# Patient Record
Sex: MALE | Race: WHITE | Employment: FULL TIME | URBAN - METROPOLITAN AREA
[De-identification: names, ages, dates, MRNs, and addresses within clinical notes are randomized per-mention and may not be internally consistent; named-entity substitution may affect disease eponyms.]

---

## 2022-08-04 ENCOUNTER — HOSPITAL ENCOUNTER (EMERGENCY)
Age: 37
Discharge: HOME | End: 2022-08-04
Attending: STUDENT IN AN ORGANIZED HEALTH CARE EDUCATION/TRAINING PROGRAM

## 2022-08-04 VITALS
DIASTOLIC BLOOD PRESSURE: 90 MMHG | WEIGHT: 203 LBS | TEMPERATURE: 98.9 F | SYSTOLIC BLOOD PRESSURE: 146 MMHG | OXYGEN SATURATION: 100 % | HEART RATE: 87 BPM | BODY MASS INDEX: 25.24 KG/M2 | RESPIRATION RATE: 19 BRPM | HEIGHT: 75 IN

## 2022-08-04 DIAGNOSIS — R42 LIGHTHEADEDNESS: Primary | ICD-10-CM

## 2022-08-04 LAB
ANION GAP SERPL CALC-SCNC: 5 MMOL/L (ref 5–15)
BASOPHILS # BLD: 0 K/UL (ref 0–0.1)
BASOPHILS NFR BLD: 1 % (ref 0–1)
BUN SERPL-MCNC: 7 MG/DL (ref 6–20)
BUN/CREAT SERPL: 8 (ref 12–20)
CALCIUM SERPL-MCNC: 9.2 MG/DL (ref 8.5–10.1)
CHLORIDE SERPL-SCNC: 106 MMOL/L (ref 97–108)
CO2 SERPL-SCNC: 29 MMOL/L (ref 21–32)
CREAT SERPL-MCNC: 0.91 MG/DL (ref 0.7–1.3)
DIFFERENTIAL METHOD BLD: ABNORMAL
EOSINOPHIL # BLD: 0.1 K/UL (ref 0–0.4)
EOSINOPHIL NFR BLD: 1 % (ref 0–7)
ERYTHROCYTE [DISTWIDTH] IN BLOOD BY AUTOMATED COUNT: 13.9 % (ref 11.5–14.5)
GLUCOSE BLD STRIP.AUTO-MCNC: 148 MG/DL (ref 65–117)
GLUCOSE SERPL-MCNC: 168 MG/DL (ref 65–100)
HCT VFR BLD AUTO: 48.5 % (ref 36.6–50.3)
HGB BLD-MCNC: 17.4 G/DL (ref 12.1–17)
IMM GRANULOCYTES # BLD AUTO: 0 K/UL (ref 0–0.04)
IMM GRANULOCYTES NFR BLD AUTO: 0 % (ref 0–0.5)
LYMPHOCYTES # BLD: 1.6 K/UL (ref 0.8–3.5)
LYMPHOCYTES NFR BLD: 24 % (ref 12–49)
MCH RBC QN AUTO: 29.4 PG (ref 26–34)
MCHC RBC AUTO-ENTMCNC: 35.9 G/DL (ref 30–36.5)
MCV RBC AUTO: 82.1 FL (ref 80–99)
MONOCYTES # BLD: 0.5 K/UL (ref 0–1)
MONOCYTES NFR BLD: 7 % (ref 5–13)
NEUTS SEG # BLD: 4.5 K/UL (ref 1.8–8)
NEUTS SEG NFR BLD: 67 % (ref 32–75)
NRBC # BLD: 0 K/UL (ref 0–0.01)
NRBC BLD-RTO: 0 PER 100 WBC
PLATELET # BLD AUTO: 154 K/UL (ref 150–400)
PMV BLD AUTO: 10.6 FL (ref 8.9–12.9)
POTASSIUM SERPL-SCNC: 3.9 MMOL/L (ref 3.5–5.1)
RBC # BLD AUTO: 5.91 M/UL (ref 4.1–5.7)
SERVICE CMNT-IMP: ABNORMAL
SODIUM SERPL-SCNC: 140 MMOL/L (ref 136–145)
WBC # BLD AUTO: 6.6 K/UL (ref 4.1–11.1)

## 2022-08-04 PROCEDURE — 96360 HYDRATION IV INFUSION INIT: CPT

## 2022-08-04 PROCEDURE — 85025 COMPLETE CBC W/AUTO DIFF WBC: CPT

## 2022-08-04 PROCEDURE — 74011250637 HC RX REV CODE- 250/637: Performed by: STUDENT IN AN ORGANIZED HEALTH CARE EDUCATION/TRAINING PROGRAM

## 2022-08-04 PROCEDURE — 80048 BASIC METABOLIC PNL TOTAL CA: CPT

## 2022-08-04 PROCEDURE — 74011250636 HC RX REV CODE- 250/636: Performed by: STUDENT IN AN ORGANIZED HEALTH CARE EDUCATION/TRAINING PROGRAM

## 2022-08-04 PROCEDURE — 36415 COLL VENOUS BLD VENIPUNCTURE: CPT

## 2022-08-04 PROCEDURE — 82962 GLUCOSE BLOOD TEST: CPT

## 2022-08-04 PROCEDURE — 99284 EMERGENCY DEPT VISIT MOD MDM: CPT

## 2022-08-04 RX ORDER — ONDANSETRON 4 MG/1
4 TABLET, ORALLY DISINTEGRATING ORAL
Status: COMPLETED | OUTPATIENT
Start: 2022-08-04 | End: 2022-08-04

## 2022-08-04 RX ADMIN — SODIUM CHLORIDE 1000 ML: 9 INJECTION, SOLUTION INTRAVENOUS at 11:52

## 2022-08-04 RX ADMIN — ONDANSETRON 4 MG: 4 TABLET, ORALLY DISINTEGRATING ORAL at 11:52

## 2022-08-04 NOTE — Clinical Note
[address]  [facility] EMERGENCY DEPT  [phone] [id number]  [phone]    Work/School Note    Date: 8/4/2022    To Whom It May concern:    Sammi Holt was seen and treated today in the emergency room by the following provider(s):  Attending Provider: Elham Powers MD.      Sammi Holt is excused from work/school on 08/04/22 and 08/05/22. He is medically clear to return to work/school on 8/6/2022.        Sincerely,          Elías Coffey MD

## 2022-08-04 NOTE — ED PROVIDER NOTES
59-year-old male with history of DM presents to the ED with chief complaint of lightheadedness starting this morning. 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 Patient reports associated difficulty focusing his vision and nausea. Patient says he is out of his metformin, does not check his blood sugar at home. Denies any chest pain, difficulty breathing, vomiting, fevers, chills, cough, body aches, urinary symptoms, bowel symptoms. The history is provided by the patient. Dizziness  Associated symptoms include nausea. Pertinent negatives include no shortness of breath, no chest pain, no vomiting, no confusion and no headaches. Chills   Pertinent negatives include no chest pain, no diarrhea, no vomiting, no congestion, no headaches, no cough, no shortness of breath, no neck pain and no rash. Past Medical History:   Diagnosis Date    Diabetes ([facility] [address]        No past surgical history on file. No family history on file. Social History     Socioeconomic History    Marital status: Not on file     Spouse name: Not on file    Number of children: Not on file    Years of education: Not on file    Highest education level: Not on file   Occupational History    Not on file   Tobacco Use    Smoking status: Not on file    Smokeless tobacco: Not on file   Substance and Sexual Activity    Alcohol use: Not on file    Drug use: Not on file    Sexual activity: Not on file   Other Topics Concern    Not on file   Social History Narrative    Not on file     Social Determinants of Health     Financial Resource Strain: Not on file   Food Insecurity: Not on file   Transportation Needs: Not on file   Physical Activity: Not on file   Stress: Not on file   Social Connections: Not on file   Intimate Partner Violence: Not on file   Housing Stability: Not on file         ALLERGIES: Patient has no known allergies. Review of Systems   Constitutional:  Negative for chills and fever.    HENT: Negative for congestion and rhinorrhea. Eyes:  Positive for visual disturbance. Respiratory:  Negative for cough, shortness of breath and wheezing. Cardiovascular:  Negative for chest pain and leg swelling. Gastrointestinal:  Positive for nausea. Negative for abdominal pain, constipation, diarrhea and vomiting. Genitourinary:  Negative for difficulty urinating, dysuria and hematuria. Musculoskeletal:  Negative for back pain and neck pain. Skin:  Negative for color change and rash. Neurological:  Positive for dizziness. Negative for weakness, numbness and headaches. Psychiatric/Behavioral:  Negative for behavioral problems and confusion. Vitals:    08/04/22 1117   BP: (!) 160/99   Pulse: (!) 105   Resp: 20   Temp: 99.8 °F (37.7 °C)   SpO2: 99%   Weight: 92.1 kg (203 lb)   Height: 6' 3\" (1.905 m)            Physical Exam  Constitutional:       General: He is not in acute distress. Appearance: He is well-developed. HENT:      Head: Normocephalic and atraumatic. Eyes:      Conjunctiva/sclera: Conjunctivae normal.      Pupils: Pupils are equal, round, and reactive to light. Neck:      Trachea: No tracheal deviation. Cardiovascular:      Rate and Rhythm: Regular rhythm. Tachycardia present. Heart sounds: No murmur heard. No friction rub. No gallop. Pulmonary:      Effort: No respiratory distress. Breath sounds: Normal breath sounds. Abdominal:      General: Bowel sounds are normal. There is no distension. Palpations: Abdomen is soft. Tenderness: There is no abdominal tenderness. Musculoskeletal:         General: No deformity. Cervical back: Neck supple. Skin:     General: Skin is warm and dry. Neurological:      General: No focal deficit present. Mental Status: He is alert and oriented to person, place, and time. Cranial Nerves: No cranial nerve deficit. Sensory: No sensory deficit. Motor: No weakness.         MDM  Number of Diagnoses or Management Options  Lightheadedness  Diagnosis management comments: 21-year-old male presenting with lightheadedness. Differential includes viral illness, electrolyte abnormality, dehydration, VICKY. No concern for neurologic cause based on history or exam.  Will check basic labs, give fluids and Zofran, and reevaluate. Likely discharge. Amount and/or Complexity of Data Reviewed  Clinical lab tests: ordered and reviewed           Procedures    DISCHARGE NOTE:  12:50 PM  The patient has been re-evaluated and feeling much better and are stable for discharge. All available radiology and laboratory results have been reviewed with patient and/or available family. Patient and/or family verbally conveyed their understanding and agreement of the patient's signs, symptoms, diagnosis, treatment and prognosis and additionally agree to follow-up as recommended in the discharge instructions or to return to the Emergency Department should their condition change or worsen prior to their follow-up appointment. All questions have been answered and patient and/or available family express understanding.       LABORATORY RESULTS:  Recent Results (from the past 24 hour(s))   GLUCOSE, POC    Collection Time: 08/04/22 11:16 AM   Result Value Ref Range    Glucose (POC) 148 (H) 65 - 117 mg/dL    Performed by Alana Hinton    CBC WITH AUTOMATED DIFF    Collection Time: 08/04/22 11:42 AM   Result Value Ref Range    WBC 6.6 4.1 - 11.1 K/uL    RBC 5.91 (H) 4.10 - 5.70 M/uL    HGB 17.4 (H) 12.1 - 17.0 g/dL    HCT 48.5 36.6 - 50.3 %    MCV 82.1 80.0 - 99.0 FL    MCH 29.4 26.0 - 34.0 PG    MCHC 35.9 30.0 - 36.5 g/dL    RDW 13.9 11.5 - 14.5 %    PLATELET 205 426 - 788 K/uL    MPV 10.6 8.9 - 12.9 FL    NRBC 0.0 0  WBC    ABSOLUTE NRBC 0.00 0.00 - 0.01 K/uL    NEUTROPHILS 67 32 - 75 %    LYMPHOCYTES 24 12 - 49 %    MONOCYTES 7 5 - 13 %    EOSINOPHILS 1 0 - 7 %    BASOPHILS 1 0 - 1 %    IMMATURE GRANULOCYTES 0 0.0 - 0.5 %    ABS. NEUTROPHILS 4.5 1.8 - 8.0 K/UL    ABS. LYMPHOCYTES 1.6 0.8 - 3.5 K/UL    ABS. MONOCYTES 0.5 0.0 - 1.0 K/UL    ABS. EOSINOPHILS 0.1 0.0 - 0.4 K/UL    ABS. BASOPHILS 0.0 0.0 - 0.1 K/UL    ABS. IMM. GRANS. 0.0 0.00 - 0.04 K/UL    DF AUTOMATED     METABOLIC PANEL, BASIC    Collection Time: 08/04/22 11:42 AM   Result Value Ref Range    Sodium 140 136 - 145 mmol/L    Potassium 3.9 3.5 - 5.1 mmol/L    Chloride 106 97 - 108 mmol/L    CO2 29 21 - 32 mmol/L    Anion gap 5 5 - 15 mmol/L    Glucose 168 (H) 65 - 100 mg/dL    BUN 7 6 - 20 MG/DL    Creatinine 0.91 0.70 - 1.30 MG/DL    BUN/Creatinine ratio 8 (L) 12 - 20      GFR est AA >60 >60 ml/min/1.73m2    GFR est non-AA >60 >60 ml/min/1.73m2    Calcium 9.2 8.5 - 10.1 MG/DL       IMAGING RESULTS:  No results found. MEDICATIONS GIVEN:  Medications   sodium chloride 0.9 % bolus infusion 1,000 mL (0 mL IntraVENous Stopped 8/4/22 1233)   ondansetron (ZOFRAN ODT) tablet 4 mg (4 mg Oral Given 8/4/22 1152)       IMPRESSION:  1. Lightheadedness        PLAN:  Follow-up Information       Follow up With Specialties Details Why Contact Info    Primary care doctor  In 3 days      [facility] EMERGENCY DEPT Emergency Medicine  If symptoms worsen [address] [place] [address]  [phone]          There are no discharge medications for this patient.       Signed By: Doc Hernandez MD     August 4, 2022

## 2022-08-04 NOTE — ED TRIAGE NOTES
Pt reports \"I am not at my baseline, I am having a hard time communicating and counting numbers. I was at work and it started and I have blurred vision. It feels like my high sugar but something feels off\". Type 1 diabetic. Reports dizziness, lightheadedness, coldness to bilateral arms and legs. Blood glucose 148 and Temp 99.8 in triage.